# Patient Record
Sex: FEMALE | Race: BLACK OR AFRICAN AMERICAN | NOT HISPANIC OR LATINO | Employment: UNEMPLOYED | URBAN - METROPOLITAN AREA
[De-identification: names, ages, dates, MRNs, and addresses within clinical notes are randomized per-mention and may not be internally consistent; named-entity substitution may affect disease eponyms.]

---

## 2024-03-29 ENCOUNTER — TELEPHONE (OUTPATIENT)
Dept: GASTROENTEROLOGY | Facility: CLINIC | Age: 49
End: 2024-03-29

## 2024-03-29 NOTE — TELEPHONE ENCOUNTER
We receive a referral for   K80.00 (ICD-10-CM) - Calculus of gallbladder with acute cholecystitis without obstruction     And I know we dont see gallbladder or gallstones in this office and from what I can see they want to check on the stones to see if surgery was needed     Please advise